# Patient Record
Sex: FEMALE | Race: WHITE
[De-identification: names, ages, dates, MRNs, and addresses within clinical notes are randomized per-mention and may not be internally consistent; named-entity substitution may affect disease eponyms.]

---

## 2024-02-28 ENCOUNTER — HOSPITAL ENCOUNTER (OUTPATIENT)
Dept: HOSPITAL 99 - RAD | Age: 74
End: 2024-02-28
Payer: MEDICARE

## 2024-02-28 DIAGNOSIS — Z12.2: Primary | ICD-10-CM

## 2024-02-28 DIAGNOSIS — R91.1: ICD-10-CM

## 2024-02-28 DIAGNOSIS — F17.290: ICD-10-CM

## 2024-03-15 ENCOUNTER — HOSPITAL ENCOUNTER (OUTPATIENT)
Dept: HOSPITAL 99 - WDC | Age: 74
End: 2024-03-15
Payer: MEDICARE

## 2024-03-15 DIAGNOSIS — Z12.31: Primary | ICD-10-CM

## 2024-04-05 ENCOUNTER — HOSPITAL ENCOUNTER (OUTPATIENT)
Dept: HOSPITAL 99 - GI | Age: 74
End: 2024-04-05
Payer: MEDICARE

## 2024-04-05 DIAGNOSIS — D12.2: ICD-10-CM

## 2024-04-05 DIAGNOSIS — Z12.11: Primary | ICD-10-CM

## 2024-04-05 DIAGNOSIS — Z86.010: ICD-10-CM

## 2024-04-05 DIAGNOSIS — D12.3: ICD-10-CM

## 2024-04-05 DIAGNOSIS — K57.30: ICD-10-CM

## 2024-04-05 DIAGNOSIS — D12.4: ICD-10-CM

## 2024-04-05 DIAGNOSIS — D12.5: ICD-10-CM

## 2024-04-05 PROCEDURE — 88305 TISSUE EXAM BY PATHOLOGIST: CPT

## 2024-04-05 PROCEDURE — 45390 COLONOSCOPY W/RESECTION: CPT

## 2024-05-06 ENCOUNTER — HOSPITAL ENCOUNTER (OUTPATIENT)
Dept: HOSPITAL 99 - RAD | Age: 74
End: 2024-05-06
Payer: MEDICARE

## 2024-05-06 DIAGNOSIS — R91.8: Primary | ICD-10-CM

## 2024-05-06 LAB
APTT PPP: 34.3 SEC (ref 23.4–35)
BUN SERPL-MCNC: 11 MG/DL (ref 7–17)
CALCIUM SERPL-MCNC: 9.2 MG/DL (ref 8.4–10.2)
CHLORIDE SERPL-SCNC: 102 MMOL/L (ref 98–107)
CO2 SERPL-SCNC: 30 MMOL/L (ref 22–30)
EGFR: > 60
ERYTHROCYTE [DISTWIDTH] IN BLOOD BY AUTOMATED COUNT: 13.1 % (ref 11.5–14.5)
ESTIMATED CREATININE CLEARANCE: 64 ML/MIN
GLUCOSE SERPL-MCNC: 82 MG/DL (ref 70–99)
HCT VFR BLD AUTO: 42.8 % (ref 37–47)
HGB BLD-MCNC: 14 G/DL (ref 12–16)
INR PPP: 0.94
MCHC RBC AUTO-ENTMCNC: 32.7 G/DL (ref 33–37)
MCV RBC AUTO: 94.3 FL (ref 81–99)
PLATELET # BLD AUTO: 257 10^3/UL (ref 130–400)
POTASSIUM SERPL-SCNC: 4.3 MMOL/L (ref 3.5–5.1)
PROTHROMBIN TIME: 12.4 SEC (ref 11.4–14.6)
SODIUM SERPL-SCNC: 138 MMOL/L (ref 135–145)

## 2024-05-13 ENCOUNTER — HOSPITAL ENCOUNTER (OUTPATIENT)
Dept: HOSPITAL 99 - GI | Age: 74
Discharge: HOME | End: 2024-05-13
Payer: MEDICARE

## 2024-05-13 VITALS — OXYGEN SATURATION: 2 %

## 2024-05-13 VITALS
SYSTOLIC BLOOD PRESSURE: 136 MMHG | OXYGEN SATURATION: 2 % | RESPIRATION RATE: 13 BRPM | DIASTOLIC BLOOD PRESSURE: 66 MMHG

## 2024-05-13 VITALS — RESPIRATION RATE: 16 BRPM

## 2024-05-13 VITALS — DIASTOLIC BLOOD PRESSURE: 66 MMHG

## 2024-05-13 VITALS — DIASTOLIC BLOOD PRESSURE: 61 MMHG | RESPIRATION RATE: 18 BRPM | SYSTOLIC BLOOD PRESSURE: 133 MMHG

## 2024-05-13 VITALS — DIASTOLIC BLOOD PRESSURE: 58 MMHG | RESPIRATION RATE: 18 BRPM

## 2024-05-13 VITALS — RESPIRATION RATE: 25 BRPM

## 2024-05-13 VITALS — DIASTOLIC BLOOD PRESSURE: 74 MMHG | RESPIRATION RATE: 19 BRPM | SYSTOLIC BLOOD PRESSURE: 124 MMHG

## 2024-05-13 VITALS — DIASTOLIC BLOOD PRESSURE: 51 MMHG | RESPIRATION RATE: 18 BRPM

## 2024-05-13 VITALS — BODY MASS INDEX: 20.7 KG/M2

## 2024-05-13 DIAGNOSIS — R91.1: ICD-10-CM

## 2024-05-13 DIAGNOSIS — J44.9: Primary | ICD-10-CM

## 2024-05-13 DIAGNOSIS — J98.4: ICD-10-CM

## 2024-05-13 PROCEDURE — 31628 BRONCHOSCOPY/LUNG BX EACH: CPT

## 2024-05-13 PROCEDURE — 31654 BRONCH EBUS IVNTJ PERPH LES: CPT

## 2024-05-13 PROCEDURE — 31623 DX BRONCHOSCOPE/BRUSH: CPT

## 2024-05-13 PROCEDURE — 88305 TISSUE EXAM BY PATHOLOGIST: CPT

## 2024-05-13 PROCEDURE — 31629 BRONCHOSCOPY/NEEDLE BX EACH: CPT

## 2024-05-13 PROCEDURE — 88172 CYTP DX EVAL FNA 1ST EA SITE: CPT

## 2024-05-13 PROCEDURE — C1887 CATHETER, GUIDING: HCPCS

## 2024-05-13 PROCEDURE — 31624 DX BRONCHOSCOPE/LAVAGE: CPT

## 2024-05-13 PROCEDURE — 31627 NAVIGATIONAL BRONCHOSCOPY: CPT

## 2024-05-13 PROCEDURE — 88173 CYTOPATH EVAL FNA REPORT: CPT

## 2024-08-07 ENCOUNTER — HOSPITAL ENCOUNTER (OUTPATIENT)
Dept: HOSPITAL 99 - RAD | Age: 74
End: 2024-08-07
Payer: MEDICARE

## 2024-08-07 DIAGNOSIS — R91.8: Primary | ICD-10-CM

## 2024-08-21 ENCOUNTER — HOSPITAL ENCOUNTER (OUTPATIENT)
Dept: HOSPITAL 99 - PAVMRI | Age: 74
End: 2024-08-21
Payer: MEDICARE

## 2024-08-21 DIAGNOSIS — M23.92: ICD-10-CM

## 2024-08-21 DIAGNOSIS — M17.12: Primary | ICD-10-CM

## 2024-09-06 ENCOUNTER — EVALUATION (OUTPATIENT)
Dept: PHYSICAL THERAPY | Facility: CLINIC | Age: 74
End: 2024-09-06
Payer: COMMERCIAL

## 2024-09-06 DIAGNOSIS — M17.12 PRIMARY OSTEOARTHRITIS OF LEFT KNEE: Primary | ICD-10-CM

## 2024-09-06 PROCEDURE — 97110 THERAPEUTIC EXERCISES: CPT | Performed by: PHYSICAL THERAPIST

## 2024-09-06 PROCEDURE — 97162 PT EVAL MOD COMPLEX 30 MIN: CPT | Performed by: PHYSICAL THERAPIST

## 2024-09-06 NOTE — PROGRESS NOTES
PT Evaluation     Today's date: 2024  Patient name: Pierre Quezada  : 1950  MRN: [id number]  Referring provider: Yanet Damian PA-C  Dx:   Encounter Diagnosis     ICD-10-CM    1. Primary osteoarthritis of left knee  M17.12                Assessment  Impairments: abnormal gait, abnormal or restricted ROM, activity intolerance, impaired physical strength, lacks appropriate home exercise program, pain with function, weight-bearing intolerance, poor posture  and poor body mechanics  Functional limitations: disturbed sleep, walk uneven surfaces, stairs, STS    Assessment details: Pierre Quezada is a 73 y.o. female who presents with pain, decreased strength, decreased ROM, and ambulatory dysfunction. Due to these impairments, patient has difficulty performing ADL's, engaging in social activities, ambulation, stair negotiation, lifting/carrying, transfers. Patient's clinical presentation is consistent with their referring diagnosis of Primary osteoarthritis of left knee  (primary encounter diagnosis). Patient has been educated in home exercise program and plan of care. Patient would benefit from skilled physical therapy services to address their aforementioned functional limitations and progress towards prior level of function and independence with home exercise program.        Prognosis: good  Prognosis details: + factors: high motivation levels  - factors: L knee OA, vertigo history    Goals  Short Term Goals to be accomplished by 10/4/24:  STG1: Pt will be I with HEP  STG2: Pt will demo 0-135 degrees in prone knee flexion ROM to improve flexibility.   STG3: Pt will demo 4+/5 MMT strength in knee to improve stair negotiation.   STG4: Pt will amb community distance without gait deviates due to pain     Long Term Goals to be accomplished by 24:   LTG1: Pt will be able to going up/down stairs with reciprocal gait with handrail without pain.  LTG2: Pt will be able to perform STS without pain and  with good mechanics.  LTG3: Pt will be able to walk for 15-20 minutes without pain.       Plan  Patient would benefit from: PT eval and skilled physical therapy  Planned modality interventions: cryotherapy, thermotherapy: hydrocollator packs and unattended electrical stimulation    Planned therapy interventions: manual therapy, neuromuscular re-education, self care, therapeutic activities, therapeutic exercise, home exercise program, patient education, joint mobilization, balance, strengthening, stretching and therapeutic training    Frequency: 2x week  Duration in weeks: 12  Plan of Care beginning date: 2024  Plan of Care expiration date: 2024  Treatment plan discussed with: patient  Plan details: HEP development, stretching, strengthening, A/AA/PROM, joint mobilizations, posture education, STM/MI as needed to reduce muscle tension, muscle reeducation, PLOC discussed and agreed upon with patient.        Subjective Evaluation    History of Present Illness  Mechanism of injury: Pt is a 72 y/o female who presents to PT with prescription for L knee OA.   Notes that pain began in February when she slipped in the snow. She tried to wait it out and rest but the pain persisted. So in /July she went to ortho where she was given an injection, which did improve her symptoms. She had follow up on 24 where she was prescribed PT, Meloxicam, and MRI ordered. Pt reported that MRI showed OA. She is currently awaiting gell injection as well.     Retired, lives in in-law suit   Patient Goals  Patient goals for therapy: decreased pain, decreased edema, improved balance, increased motion, increased strength and independence with ADLs/IADLs  Patient goal: disturbed sleep, walk uneven surfaces, stairs, STS  Pain  Current pain ratin  At best pain ratin  At worst pain ratin  Location: L knee  Quality: tight and sharp  Relieving factors: rest and relaxation  Aggravating factors: standing, walking, stair  climbing and lifting    Social Support  Steps to enter house: yes  13  13    Treatments  Current treatment: physical therapy        Objective     Observations   Left Knee   Positive for effusion.     Additional Observation Details  Gait: normal   L lateral shift in standing    Active Range of Motion   Left Knee   Flexion: 145 degrees with pain  Extension: 0 degrees with pain    Right Knee   Flexion: 145 degrees   Extension: 0 degrees     Passive Range of Motion   Left Knee   Prone flexion: 125 degrees     Right Knee   Prone flexion: 135 degrees     Mobility   Patellar Mobility:   Left Knee   WFL: medial, lateral, superior and inferior.     Right Knee   WFL: medial, lateral, superior and inferior    Strength/Myotome Testing     Left Knee   Flexion: 4-  Extension: 4-    Right Knee   Flexion: 5  Extension: 5    Additional Strength Details  Pain with L knee extension MMT    S/l hip abd:   R: 4-  L: 4-     Tests     Additional Tests Details  SLS  R: 30 sec   L: 30 sec (pressure noted)           Precautions:   High cholestorol - controlled with meds  Normal BP  Scoliosis  Vertigo with supine to sit  L knee OA - MRI confirmed     Manuals 9/6                                            Neuro Re-Ed 9/6                                                                       Ther Ex 9/6        Prone knee flexion stretch strap :30x3         SLR 2x10 ea        S/l hip abd 2x10 ea        Heel raise                                             Ther Activity 9/6        Step up         Lateral step down         RB

## 2024-09-06 NOTE — LETTER
2024    Yanet Damian PA-C  [address] [place] [id number]    Patient: Pierre Quezada   YOB: 1950   Date of Visit: 2024     Encounter Diagnosis     ICD-10-CM    1. Primary osteoarthritis of left knee  M17.12           Dear Dr. Damian:    Thank you for your recent referral of Pierre Quezada. Please review the attached evaluation summary from Pierre's recent visit.     Please verify that you agree with the plan of care by signing the attached order.     If you have any questions or concerns, please do not hesitate to call.     I sincerely appreciate the opportunity to share in the care of one of your patients and hope to have another opportunity to work with you in the near future.       Sincerely,    Flip Palacio, PT      Referring Provider:      I certify that I have read the below Plan of Care and certify the need for these services furnished under this plan of treatment while under my care.                    Yanet Damian PA-C  [address] [address]  Via Fax: [phone]          PT Evaluation     Today's date: 2024  Patient name: Pierre Quezada  : 1950  MRN: [id number]  Referring provider: Yanet Damian PA-C  Dx:   Encounter Diagnosis     ICD-10-CM    1. Primary osteoarthritis of left knee  M17.12                Assessment  Impairments: abnormal gait, abnormal or restricted ROM, activity intolerance, impaired physical strength, lacks appropriate home exercise program, pain with function, weight-bearing intolerance, poor posture  and poor body mechanics  Functional limitations: disturbed sleep, walk uneven surfaces, stairs, STS    Assessment details: Pierre Quezada is a 73 y.o. female who presents with pain, decreased strength, decreased ROM, and ambulatory dysfunction. Due to these impairments, patient has difficulty performing ADL's, engaging in social activities, ambulation, stair negotiation,  lifting/carrying, transfers. Patient's clinical presentation is consistent with their referring diagnosis of Primary osteoarthritis of left knee  (primary encounter diagnosis). Patient has been educated in home exercise program and plan of care. Patient would benefit from skilled physical therapy services to address their aforementioned functional limitations and progress towards prior level of function and independence with home exercise program.        Prognosis: good  Prognosis details: + factors: high motivation levels  - factors: L knee OA, vertigo history    Goals  Short Term Goals to be accomplished by 10/4/24:  STG1: Pt will be I with HEP  STG2: Pt will demo 0-135 degrees in prone knee flexion ROM to improve flexibility.   STG3: Pt will demo 4+/5 MMT strength in knee to improve stair negotiation.   STG4: Pt will amb community distance without gait deviates due to pain     Long Term Goals to be accomplished by 11/29/24:   LTG1: Pt will be able to going up/down stairs with reciprocal gait with handrail without pain.  LTG2: Pt will be able to perform STS without pain and with good mechanics.  LTG3: Pt will be able to walk for 15-20 minutes without pain.       Plan  Patient would benefit from: PT eval and skilled physical therapy  Planned modality interventions: cryotherapy, thermotherapy: hydrocollator packs and unattended electrical stimulation    Planned therapy interventions: manual therapy, neuromuscular re-education, self care, therapeutic activities, therapeutic exercise, home exercise program, patient education, joint mobilization, balance, strengthening, stretching and therapeutic training    Frequency: 2x week  Duration in weeks: 12  Plan of Care beginning date: 9/6/2024  Plan of Care expiration date: 11/29/2024  Treatment plan discussed with: patient  Plan details: HEP development, stretching, strengthening, A/AA/PROM, joint mobilizations, posture education, STM/MI as needed to reduce muscle tension,  muscle reeducation, PLOC discussed and agreed upon with patient.        Subjective Evaluation    History of Present Illness  Mechanism of injury: Pt is a 74 y/o female who presents to PT with prescription for L knee OA.   Notes that pain began in February when she slipped in the snow. She tried to wait it out and rest but the pain persisted. So in /July she went to ortho where she was given an injection, which did improve her symptoms. She had follow up on 24 where she was prescribed PT, Meloxicam, and MRI ordered. Pt reported that MRI showed OA. She is currently awaiting gell injection as well.     Retired, lives in in-law suit   Patient Goals  Patient goals for therapy: decreased pain, decreased edema, improved balance, increased motion, increased strength and independence with ADLs/IADLs  Patient goal: disturbed sleep, walk uneven surfaces, stairs, STS  Pain  Current pain ratin  At best pain ratin  At worst pain ratin  Location: L knee  Quality: tight and sharp  Relieving factors: rest and relaxation  Aggravating factors: standing, walking, stair climbing and lifting    Social Support  Steps to enter house: yes  13  13    Treatments  Current treatment: physical therapy        Objective     Observations   Left Knee   Positive for effusion.     Additional Observation Details  Gait: normal   L lateral shift in standing    Active Range of Motion   Left Knee   Flexion: 145 degrees with pain  Extension: 0 degrees with pain    Right Knee   Flexion: 145 degrees   Extension: 0 degrees     Passive Range of Motion   Left Knee   Prone flexion: 125 degrees     Right Knee   Prone flexion: 135 degrees     Mobility   Patellar Mobility:   Left Knee   WFL: medial, lateral, superior and inferior.     Right Knee   WFL: medial, lateral, superior and inferior    Strength/Myotome Testing     Left Knee   Flexion: 4-  Extension: 4-    Right Knee   Flexion: 5  Extension: 5    Additional Strength Details  Pain with L  knee extension MMT    S/l hip abd:   R: 4-  L: 4-     Tests     Additional Tests Details  SLS  R: 30 sec   L: 30 sec (pressure noted)           Precautions:   High cholestorol - controlled with meds  Normal BP  Scoliosis  Vertigo with supine to sit  L knee OA - MRI confirmed     Manuals 9/6                                            Neuro Re-Ed 9/6                                                                       Ther Ex 9/6        Prone knee flexion stretch strap :30x3         SLR 2x10 ea        S/l hip abd 2x10 ea        Heel raise                                             Ther Activity 9/6        Step up         Lateral step down         RB

## 2024-09-09 ENCOUNTER — OFFICE VISIT (OUTPATIENT)
Dept: PHYSICAL THERAPY | Facility: CLINIC | Age: 74
End: 2024-09-09
Payer: COMMERCIAL

## 2024-09-09 DIAGNOSIS — M17.12 PRIMARY OSTEOARTHRITIS OF LEFT KNEE: Primary | ICD-10-CM

## 2024-09-09 PROCEDURE — 97110 THERAPEUTIC EXERCISES: CPT

## 2024-09-09 PROCEDURE — 97530 THERAPEUTIC ACTIVITIES: CPT

## 2024-09-09 NOTE — PROGRESS NOTES
"Daily Note     Today's date: 2024  Patient name: Pierre Quezada  : 1950  MRN: [id number]  Referring provider: Yanet Damian PA-C  Dx:   Encounter Diagnosis     ICD-10-CM    1. Primary osteoarthritis of left knee  M17.12                      Subjective: Pierre denies increased L knee soreness following IE. Notes daily compliance with HEP, no questions at this time. Meloxicam offering some pain relief.       Objective: See treatment diary below      Assessment: Pierre tolerated PT treatment well. Initiated session with RB warm up for hip/knee ROM. Reviewed HEP, cues required for proper technique with s/l hip abduction. Introduced STS and stair navigation to further challenge functional strength, foam A and UEA required. Denied knee provocation throughout and post session. Pt would benefit from continued PT to further address impairments and maximize functional level.      Plan: Continue per plan of care.      Precautions:   High cholestorol - controlled with meds  Normal BP  Scoliosis  Vertigo with supine to sit  L knee OA - MRI confirmed     Manuals                                            Neuro Re-Ed                                                                       Ther Ex        Prone knee flexion stretch strap :30x3  :30x3 ea        SLR 2x10 ea 2x10 ea       S/l hip abd 2x10 ea 2x10 ea       Heel raise  3x10        Bridge   2x10                                   Ther Activity        Step up  4\" x10 ea        Lateral step down         RB  Lvl 1 5'       STS   1 foam x10                                                 "

## 2024-09-16 ENCOUNTER — OFFICE VISIT (OUTPATIENT)
Dept: PHYSICAL THERAPY | Facility: CLINIC | Age: 74
End: 2024-09-16
Payer: COMMERCIAL

## 2024-09-16 DIAGNOSIS — M17.12 PRIMARY OSTEOARTHRITIS OF LEFT KNEE: Primary | ICD-10-CM

## 2024-09-16 PROCEDURE — 97110 THERAPEUTIC EXERCISES: CPT

## 2024-09-16 PROCEDURE — 97530 THERAPEUTIC ACTIVITIES: CPT

## 2024-09-16 NOTE — PROGRESS NOTES
"Daily Note     Today's date: 2024  Patient name: Pierre Quezada  : 1950  MRN: [id number]  Referring provider: Yanet Damian PA-C  Dx:   Encounter Diagnosis     ICD-10-CM    1. Primary osteoarthritis of left knee  M17.12                        Subjective: Pierre denies L knee soreness following last PT session. Notes her legs are feeling stronger and overall less pain throughout daily activities. Notes she still does not feel confident going down the steps.      Objective: See treatment diary below      Assessment: Pierre tolerated PT treatment well. Advanced repetitions throughout open chain strengthening, quad and glute fatigue evident by third set. Advanced step height with forward step up, denied knee provocation. Introduced forward step down; moderate cueing required for proper mechanics with significant eccentric quad weakness observed. Add leg press NV. Pt would benefit from continued PT to further address impairments and maximize functional level.      Plan: Continue per plan of care.      Precautions:   High cholestorol - controlled with meds  Normal BP  Scoliosis  Vertigo with supine to sit  L knee OA - MRI confirmed     Manuals                                           Neuro Re-Ed                                                                      Ther Ex        Prone knee flexion stretch strap :30x3  :30x3 ea  :30x3 ea       SLR 2x10 ea 2x10 ea 3x10 ea       S/l hip abd 2x10 ea 2x10 ea 3x10 ea       Heel raise  3x10  3x10      Bridge   2x10  3x10 :05                                  Ther Activity       Step up  4\" x10 ea  6\" 2x10       Lateral step down   2\" x10 ea       RB  Lvl 1 5' Lvl 1 5'       STS   1 foam x10  1 foam 3x10                                                "

## 2024-09-19 ENCOUNTER — OFFICE VISIT (OUTPATIENT)
Dept: PHYSICAL THERAPY | Facility: CLINIC | Age: 74
End: 2024-09-19
Payer: COMMERCIAL

## 2024-09-19 DIAGNOSIS — M17.12 PRIMARY OSTEOARTHRITIS OF LEFT KNEE: Primary | ICD-10-CM

## 2024-09-19 PROCEDURE — 97530 THERAPEUTIC ACTIVITIES: CPT | Performed by: PHYSICAL THERAPIST

## 2024-09-19 PROCEDURE — 97110 THERAPEUTIC EXERCISES: CPT | Performed by: PHYSICAL THERAPIST

## 2024-09-19 NOTE — PROGRESS NOTES
"Daily Note     Today's date: 2024  Patient name: Pierre Quezada  : 1950  MRN: [id number]  Referring provider: Yanet Damian PA-C  Dx:   Encounter Diagnosis     ICD-10-CM    1. Primary osteoarthritis of left knee  M17.12              Subjective: Pt reported that she is scheduled to have an injection today after PT. Noted that she has been improving with her strength and ROM since starting PT.     Objective: See treatment diary below    Assessment: Tolerated treatment well. Difficulty with step exercises due to anterior knee pain. Advanced hip strengthening to improve stair and gait mechanics without pain. Patient demonstrated fatigue post treatment, exhibited good technique with therapeutic exercises, and would benefit from continued PT    Plan: Continue per plan of care.      Precautions:   High cholestorol - controlled with meds  Normal BP  Scoliosis  Vertigo with supine to sit  L knee OA - MRI confirmed     Manuals                                          Neuro Re-Ed                                                                     Ther Ex      Prone knee flexion stretch strap :30x3  :30x3 ea  :30x3 ea  :30x3 ea     SLR 2x10 ea 2x10 ea 3x10 ea  30x ea no rest      S/l hip abd 2x10 ea 2x10 ea 3x10 ea  30x ea no rest     Heel raise  3x10  3x10      Bridge   2x10  3x10 :05  TB abd   OTB :10x10     HL clamshell     OTB 3x10 ea     LAQ     3# 3x10               Updated HEP    5'               Ther Activity      Step up  4\" x10 ea  6\" 2x10  Trialed with UE assist and adjusting step heights, however unable to perform without pain Hold     Lateral step down   2\" x10 ea   Hold     RB  Lvl 1 5' Lvl 1 5'  Lvl1 5'      STS   1 foam x10  1 foam 3x10       U/l LP     *                                      "

## 2024-09-20 ENCOUNTER — APPOINTMENT (OUTPATIENT)
Dept: PHYSICAL THERAPY | Facility: CLINIC | Age: 74
End: 2024-09-20
Payer: COMMERCIAL

## 2024-09-23 ENCOUNTER — EVALUATION (OUTPATIENT)
Dept: PHYSICAL THERAPY | Facility: CLINIC | Age: 74
End: 2024-09-23
Payer: COMMERCIAL

## 2024-09-23 DIAGNOSIS — M17.12 PRIMARY OSTEOARTHRITIS OF LEFT KNEE: Primary | ICD-10-CM

## 2024-09-23 PROCEDURE — 97530 THERAPEUTIC ACTIVITIES: CPT | Performed by: PHYSICAL THERAPIST

## 2024-09-23 PROCEDURE — 97110 THERAPEUTIC EXERCISES: CPT | Performed by: PHYSICAL THERAPIST

## 2024-09-23 PROCEDURE — 97112 NEUROMUSCULAR REEDUCATION: CPT | Performed by: PHYSICAL THERAPIST

## 2024-09-23 NOTE — PROGRESS NOTES
PT Re-Evaluation     Today's date: 2024  Patient name: Pierre Quezada  : 1950  MRN: [id number]  Referring provider: Yanet Damian PA-C  Dx:   Encounter Diagnosis     ICD-10-CM    1. Primary osteoarthritis of left knee  M17.12                Assessment  Impairments: abnormal gait, abnormal or restricted ROM, activity intolerance, impaired physical strength, lacks appropriate home exercise program, pain with function, weight-bearing intolerance, poor posture  and poor body mechanics  Functional limitations: disturbed sleep, walk uneven surfaces, stairs, STS    Assessment details:   Since starting PT Pierre reports GROC improvement of 25%. MMT demonstrated some improvement in L knee strength. ROM assessment demonstrated improved prone knee flexion. Pain assessment demonstrated improvement. Functional outcome measures and subjective reports demonstrated improved functional ability. Despite improvements she continues to present with pain, decreased strength, decreased ROM, and ambulatory dysfunction. Due to these impairments, patient has difficulty performing ADL's, engaging in social activities, ambulation, stair negotiation, lifting/carrying, transfers. Patient's clinical presentation is consistent with their referring diagnosis of Primary osteoarthritis of left knee  (primary encounter diagnosis). Patient has been educated in home exercise program and plan of care. Patient would benefit from skilled physical therapy services to address their aforementioned functional limitations and progress towards prior level of function and independence with home exercise program.        Prognosis: good  Prognosis details: + factors: high motivation levels  - factors: L knee OA, vertigo history    Goals  Short Term Goals to be accomplished by 10/4/24:  STG1: Pt will be I with HEP. Achieved.   STG2: Pt will demo 0-135 degrees in prone knee flexion ROM to improve flexibility. (130 achieved)   STG3: Pt will demo 4+/5  MMT strength in knee to improve stair negotiation. (Progressing toward)  STG4: Pt will amb community distance without gait deviates due to pain. Achieved.      Long Term Goals to be accomplished by 11/29/24:   LTG1: Pt will be able to going up/down stairs with reciprocal gait with handrail without pain. (Progressing toward)  LTG2: Pt will be able to perform STS without pain and with good mechanics. (Progressing toward)  LTG3: Pt will be able to walk for 15-20 minutes without pain. (Progressing toward)      Plan  Patient would benefit from: PT eval and skilled physical therapy  Planned modality interventions: cryotherapy, thermotherapy: hydrocollator packs and unattended electrical stimulation    Planned therapy interventions: manual therapy, neuromuscular re-education, self care, therapeutic activities, therapeutic exercise, home exercise program, patient education, joint mobilization, balance, strengthening, stretching and therapeutic training    Frequency: 2x week  Duration in weeks: 12  Plan of Care beginning date: 9/6/2024  Plan of Care expiration date: 11/29/2024  Treatment plan discussed with: patient  Plan details: HEP development, stretching, strengthening, A/AA/PROM, joint mobilizations, posture education, STM/MI as needed to reduce muscle tension, muscle reeducation, PLOC discussed and agreed upon with patient.        Subjective Evaluation    History of Present Illness  Mechanism of injury: Pt is a 72 y/o female who presents to PT with prescription for L knee OA.   Notes that pain began in February when she slipped in the snow. She tried to wait it out and rest but the pain persisted. So in June/July she went to ortho where she was given an injection, which did improve her symptoms. She had follow up on 8/28/24 where she was prescribed PT, Meloxicam, and MRI ordered. Pt reported that MRI showed OA. She is currently awaiting gell injection as well.     Retired, lives in in-law suit   Patient Goals  Patient  goals for therapy: decreased pain, decreased edema, improved balance, increased motion, increased strength and independence with ADLs/IADLs  Patient goal: disturbed sleep, walk uneven surfaces, stairs, STS  Pain  Current pain ratin  At best pain ratin  At worst pain ratin  Location: L knee  Quality: tight and sharp  Relieving factors: rest and relaxation  Aggravating factors: standing, walking, stair climbing and lifting    Social Support  Steps to enter house: yes  13  13    Treatments  Current treatment: physical therapy        Objective     Observations   Left Knee   Positive for effusion.     Additional Observation Details  Gait: normal   L lateral shift in standing    Active Range of Motion   Left Knee   Flexion: 145 degrees with pain  Extension: 0 degrees with pain    Right Knee   Flexion: 145 degrees   Extension: 0 degrees     Passive Range of Motion   Left Knee   Prone flexion: 130 degrees     Right Knee   Prone flexion: 135 degrees     Mobility   Patellar Mobility:   Left Knee   WFL: medial, lateral, superior and inferior.     Right Knee   WFL: medial, lateral, superior and inferior    Strength/Myotome Testing     Left Knee   Flexion: 4  Extension: 4    Right Knee   Flexion: 5  Extension: 5    Additional Strength Details  Pain with L knee extension MMT    S/l hip abd:   R: 4-  L: 4-     Tests     Additional Tests Details  SLS  R: 30 sec   L: 30 sec (pressure noted)       Precautions:   High cholestorol - controlled with meds  Normal BP  Scoliosis  Vertigo with supine to sit  L knee OA - MRI confirmed     Manuals                                         Neuro Re-Ed     Terminal knee extension ball wall     :10x5    TB shallow squat - hips tap 2 foam      2x10 frequent v/c for hip/knee positioning - tendency to R weight shift     Prone hip ext      :10x5 ea                                        Ther Ex     Prone knee flexion stretch  "strap :30x3  :30x3 ea  :30x3 ea  :30x3 ea :30x3    SLR 2x10 ea 2x10 ea 3x10 ea  30x ea no rest      S/l hip abd 2x10 ea 2x10 ea 3x10 ea  30x ea no rest     Heel raise  3x10  3x10      Bridge   2x10  3x10 :05  TB abd   OTB :10x10     HL clamshell     OTB 3x10 ea     LAQ     3# 3x10  3# 5 sec 2x10     SAQ      3# 5 sec 2x10     Updated HEP    5'               Ther Activity 9/6 9/9 9/16 9/19 9/23    Step up  4\" x10 ea  6\" 2x10  Trialed with UE assist and adjusting step heights, however unable to perform without pain Hold     Lateral step down   2\" x10 ea   Hold     RB  Lvl 1 5' Lvl 1 5'  Lvl1 5'  Lvl1 6'     STS   1 foam x10  1 foam 3x10       U/l LP     Seat5 L only  10# 3x10                                    "

## 2024-09-23 NOTE — LETTER
2024    Yanet Damian PA-C  [address] [address]    Patient: Pierre Quezada   YOB: 1950   Date of Visit: 2024     Encounter Diagnosis     ICD-10-CM    1. Primary osteoarthritis of left knee  M17.12           Dear Dr. Damian:    Thank you for your recent referral of Pierre Quezada. Please review the attached evaluation summary from Pierre's recent visit.     Please verify that you agree with the plan of care by signing the attached order.     If you have any questions or concerns, please do not hesitate to call.     I sincerely appreciate the opportunity to share in the care of one of your patients and hope to have another opportunity to work with you in the near future.       Sincerely,    Flip Palacio, PT      Referring Provider:      I certify that I have read the below Plan of Care and certify the need for these services furnished under this plan of treatment while under my care.                    Yanet Damian PA-C  [address] [address]  Via Fax: [phone]          PT Re-Evaluation     Today's date: 2024  Patient name: Pierre Quezada  : 1950  MRN: [id number]  Referring provider: Yanet Damian PA-C  Dx:   Encounter Diagnosis     ICD-10-CM    1. Primary osteoarthritis of left knee  M17.12                Assessment  Impairments: abnormal gait, abnormal or restricted ROM, activity intolerance, impaired physical strength, lacks appropriate home exercise program, pain with function, weight-bearing intolerance, poor posture  and poor body mechanics  Functional limitations: disturbed sleep, walk uneven surfaces, stairs, STS    Assessment details:   Since starting PT Pierre reports GROC improvement of 25%. MMT demonstrated some improvement in L knee strength. ROM assessment demonstrated improved prone knee flexion. Pain assessment demonstrated improvement. Functional outcome measures and subjective  reports demonstrated improved functional ability. Despite improvements she continues to present with pain, decreased strength, decreased ROM, and ambulatory dysfunction. Due to these impairments, patient has difficulty performing ADL's, engaging in social activities, ambulation, stair negotiation, lifting/carrying, transfers. Patient's clinical presentation is consistent with their referring diagnosis of Primary osteoarthritis of left knee  (primary encounter diagnosis). Patient has been educated in home exercise program and plan of care. Patient would benefit from skilled physical therapy services to address their aforementioned functional limitations and progress towards prior level of function and independence with home exercise program.        Prognosis: good  Prognosis details: + factors: high motivation levels  - factors: L knee OA, vertigo history    Goals  Short Term Goals to be accomplished by 10/4/24:  STG1: Pt will be I with HEP. Achieved.   STG2: Pt will demo 0-135 degrees in prone knee flexion ROM to improve flexibility. (130 achieved)   STG3: Pt will demo 4+/5 MMT strength in knee to improve stair negotiation. (Progressing toward)  STG4: Pt will amb community distance without gait deviates due to pain. Achieved.      Long Term Goals to be accomplished by 11/29/24:   LTG1: Pt will be able to going up/down stairs with reciprocal gait with handrail without pain. (Progressing toward)  LTG2: Pt will be able to perform STS without pain and with good mechanics. (Progressing toward)  LTG3: Pt will be able to walk for 15-20 minutes without pain. (Progressing toward)      Plan  Patient would benefit from: PT eval and skilled physical therapy  Planned modality interventions: cryotherapy, thermotherapy: hydrocollator packs and unattended electrical stimulation    Planned therapy interventions: manual therapy, neuromuscular re-education, self care, therapeutic activities, therapeutic exercise, home exercise  program, patient education, joint mobilization, balance, strengthening, stretching and therapeutic training    Frequency: 2x week  Duration in weeks: 12  Plan of Care beginning date: 2024  Plan of Care expiration date: 2024  Treatment plan discussed with: patient  Plan details: HEP development, stretching, strengthening, A/AA/PROM, joint mobilizations, posture education, STM/MI as needed to reduce muscle tension, muscle reeducation, PLOC discussed and agreed upon with patient.        Subjective Evaluation    History of Present Illness  Mechanism of injury: Pt is a 74 y/o female who presents to PT with prescription for L knee OA.   Notes that pain began in February when she slipped in the snow. She tried to wait it out and rest but the pain persisted. So in  she went to ortho where she was given an injection, which did improve her symptoms. She had follow up on 24 where she was prescribed PT, Meloxicam, and MRI ordered. Pt reported that MRI showed OA. She is currently awaiting gell injection as well.     Retired, lives in in-law suit   Patient Goals  Patient goals for therapy: decreased pain, decreased edema, improved balance, increased motion, increased strength and independence with ADLs/IADLs  Patient goal: disturbed sleep, walk uneven surfaces, stairs, STS  Pain  Current pain ratin  At best pain ratin  At worst pain ratin  Location: L knee  Quality: tight and sharp  Relieving factors: rest and relaxation  Aggravating factors: standing, walking, stair climbing and lifting    Social Support  Steps to enter house: yes  13  13    Treatments  Current treatment: physical therapy        Objective     Observations   Left Knee   Positive for effusion.     Additional Observation Details  Gait: normal   L lateral shift in standing    Active Range of Motion   Left Knee   Flexion: 145 degrees with pain  Extension: 0 degrees with pain    Right Knee   Flexion: 145 degrees   Extension: 0  "degrees     Passive Range of Motion   Left Knee   Prone flexion: 130 degrees     Right Knee   Prone flexion: 135 degrees     Mobility   Patellar Mobility:   Left Knee   WFL: medial, lateral, superior and inferior.     Right Knee   WFL: medial, lateral, superior and inferior    Strength/Myotome Testing     Left Knee   Flexion: 4  Extension: 4    Right Knee   Flexion: 5  Extension: 5    Additional Strength Details  Pain with L knee extension MMT    S/l hip abd:   R: 4-  L: 4-     Tests     Additional Tests Details  SLS  R: 30 sec   L: 30 sec (pressure noted)       Precautions:   High cholestorol - controlled with meds  Normal BP  Scoliosis  Vertigo with supine to sit  L knee OA - MRI confirmed     Manuals 9/6 9/9 9/16 9/19 9/23                                        Neuro Re-Ed 9/6 9/9 9/16 9/19 9/23    Terminal knee extension ball wall     :10x5    TB shallow squat - hips tap 2 foam      2x10 frequent v/c for hip/knee positioning - tendency to R weight shift     Prone hip ext      :10x5 ea                                        Ther Ex 9/6 9/9 9/19 9/23    Prone knee flexion stretch strap :30x3  :30x3 ea  :30x3 ea  :30x3 ea :30x3    SLR 2x10 ea 2x10 ea 3x10 ea  30x ea no rest      S/l hip abd 2x10 ea 2x10 ea 3x10 ea  30x ea no rest     Heel raise  3x10  3x10      Bridge   2x10  3x10 :05  TB abd   OTB :10x10     HL clamshell     OTB 3x10 ea     LAQ     3# 3x10  3# 5 sec 2x10     SAQ      3# 5 sec 2x10     Updated HEP    5'               Ther Activity 9/6 9/9 9/16 9/19 9/23    Step up  4\" x10 ea  6\" 2x10  Trialed with UE assist and adjusting step heights, however unable to perform without pain Hold     Lateral step down   2\" x10 ea   Hold     RB  Lvl 1 5' Lvl 1 5'  Lvl1 5'  Lvl1 6'     STS   1 foam x10  1 foam 3x10       U/l LP     Seat5 L only  10# 3x10                                                    "

## 2024-09-26 ENCOUNTER — APPOINTMENT (OUTPATIENT)
Dept: PHYSICAL THERAPY | Facility: CLINIC | Age: 74
End: 2024-09-26
Payer: COMMERCIAL

## 2024-09-26 ENCOUNTER — OFFICE VISIT (OUTPATIENT)
Dept: PHYSICAL THERAPY | Facility: CLINIC | Age: 74
End: 2024-09-26
Payer: COMMERCIAL

## 2024-09-26 DIAGNOSIS — M17.12 PRIMARY OSTEOARTHRITIS OF LEFT KNEE: Primary | ICD-10-CM

## 2024-09-26 PROCEDURE — 97530 THERAPEUTIC ACTIVITIES: CPT

## 2024-09-26 PROCEDURE — 97110 THERAPEUTIC EXERCISES: CPT

## 2024-09-26 PROCEDURE — 97112 NEUROMUSCULAR REEDUCATION: CPT

## 2024-09-26 NOTE — PROGRESS NOTES
"Daily Note     Today's date: 2024  Patient name: Pierre Quezada  : 1950  MRN: [id number]  Referring provider: Yanet Damian PA-C  Dx:   Encounter Diagnosis     ICD-10-CM    1. Primary osteoarthritis of left knee  M17.12                      Subjective: Pierre reports moderate soreness from LV.       Objective: See treatment diary below      Assessment: Tolerated treatment well. Fair squat mechanics, favoring RLE- little carryover from tactile and verbal cuing. Generalized moderate muscle fatigue noted with SAQ/LAQ, and leg press. Continued PT would be beneficial to improve function.          Plan: Continue per plan of care.       Precautions:   High cholestorol - controlled with meds  Normal BP  Scoliosis  Vertigo with supine to sit  L knee OA - MRI confirmed       Manuals                                        Neuro Re-Ed    Terminal knee extension ball wall     :10x5 :10x5   TB shallow squat - hips tap 2 foam      2x10 frequent v/c for hip/knee positioning - tendency to R weight shift  2x10 frequent v/c for hip/knee positioning - tendency to R weight shift    Prone hip ext      :10x5 ea :10x5 ea                                       Ther Ex    Prone knee flexion stretch strap :30x3  :30x3 ea  :30x3 ea  :30x3 ea :30x3 :30x3   SLR 2x10 ea 2x10 ea 3x10 ea  30x ea no rest   30x ea no rest    S/l hip abd 2x10 ea 2x10 ea 3x10 ea  30x ea no rest  30x ea no rest   Heel raise  3x10  3x10      Bridge   2x10  3x10 :05  TB abd   OTB :10x10  TB abd   OTB :10x10   HL clamshell     OTB 3x10 ea     LAQ     3# 3x10  3# 5 sec 2x10  3# 5 sec 2x10    SAQ      3# 5 sec 2x10  3# 5 sec 2x10    Updated HEP    5'               Ther Activity    Step up HOLD  4\" x10 ea  6\" 2x10  Trialed with UE assist and adjusting step heights, however unable to perform without pain Hold     Lateral step down HOLD   2\" x10 ea   Hold   "   RB  Lvl 1 5' Lvl 1 5'  Lvl1 5'  Lvl1 6'  Lvl1 6'    STS   1 foam x10  1 foam 3x10       U/l LP     Seat5 L only  10# 3x10    Seat5 L only  10# 3x10, 15#x5

## 2024-09-30 ENCOUNTER — OFFICE VISIT (OUTPATIENT)
Dept: PHYSICAL THERAPY | Facility: CLINIC | Age: 74
End: 2024-09-30
Payer: COMMERCIAL

## 2024-09-30 DIAGNOSIS — M17.12 PRIMARY OSTEOARTHRITIS OF LEFT KNEE: Primary | ICD-10-CM

## 2024-09-30 PROCEDURE — 97140 MANUAL THERAPY 1/> REGIONS: CPT | Performed by: PHYSICAL THERAPIST

## 2024-09-30 PROCEDURE — 97530 THERAPEUTIC ACTIVITIES: CPT | Performed by: PHYSICAL THERAPIST

## 2024-09-30 PROCEDURE — 97110 THERAPEUTIC EXERCISES: CPT | Performed by: PHYSICAL THERAPIST

## 2024-09-30 NOTE — PROGRESS NOTES
Daily Note     Today's date: 2024  Patient name: Pierre Quezada  : 1950  MRN: [id number]  Referring provider: Yanet Damian PA-C  Dx:   Encounter Diagnosis     ICD-10-CM    1. Primary osteoarthritis of left knee  M17.12              Subjective: Pt reported that she is improving with her strength and ROM as the sessions continue.     Objective: See treatment diary below    Assessment: Tolerated treatment well. Focused exercises on pain free quad strengthening, which she was able to perform well without provocation of pain. Patient demonstrated fatigue post treatment, exhibited good technique with therapeutic exercises, and would benefit from continued PT    Plan: Continue per plan of care.      Precautions:   High cholestorol - controlled with meds  Normal BP  Scoliosis  Vertigo with supine to sit  L knee OA - MRI confirmed       Manuals    Tib fem / pat mobs  JZ        Knee ext PROM stretch JZ                          Neuro Re-Ed    Terminal knee extension ball wall     :10x5 :10x5   TB shallow squat - hips tap 2 foam      2x10 frequent v/c for hip/knee positioning - tendency to R weight shift  2x10 frequent v/c for hip/knee positioning - tendency to R weight shift    Prone hip ext      :10x5 ea :10x5 ea                                       Ther Ex    Prone knee flexion stretch strap   :30x3 ea  :30x3 ea :30x3 :30x3   SLR 2# 3x10 ea  3x10 ea  30x ea no rest   30x ea no rest    S/l hip abd   3x10 ea  30x ea no rest  30x ea no rest   Heel raise   3x10      Bridge    3x10 :05  TB abd   OTB :10x10  TB abd   OTB :10x10   Fig4 bridge 2x10 ea        HL clamshell     OTB 3x10 ea     LAQ  5# 5 sec 2x10 ea (decreased due to anterior knee pain)  2# 30 sec x4 ea no pain   3# 3x10  3# 5 sec 2x10  3# 5 sec 2x10    SAQ  2# 30 sec x3 ea  decreased due to anterior knee pain    3# 5 sec 2x10  3# 5 sec 2x10    Updated HEP    5'                     "             Ther Activity 9/30 9/16 9/19 9/23 9/26   Step up HOLD   6\" 2x10  Trialed with UE assist and adjusting step heights, however unable to perform without pain Hold     Lateral step down HOLD   2\" x10 ea   Hold     RB Lvl1 5'   Lvl 1 5'  Lvl1 5'  Lvl1 6'  Lvl1 6'    STS    1 foam 3x10       U/l LP Seat8 (5 too close)  20# 3x10     Seat5 L only  10# 3x10    Seat5 L only  10# 3x10, 15#x5                                     "

## 2024-10-03 ENCOUNTER — OFFICE VISIT (OUTPATIENT)
Dept: PHYSICAL THERAPY | Facility: CLINIC | Age: 74
End: 2024-10-03
Payer: COMMERCIAL

## 2024-10-03 DIAGNOSIS — M17.12 PRIMARY OSTEOARTHRITIS OF LEFT KNEE: Primary | ICD-10-CM

## 2024-10-03 PROCEDURE — 97140 MANUAL THERAPY 1/> REGIONS: CPT

## 2024-10-03 PROCEDURE — 97110 THERAPEUTIC EXERCISES: CPT

## 2024-10-03 PROCEDURE — 97530 THERAPEUTIC ACTIVITIES: CPT

## 2024-10-03 NOTE — PROGRESS NOTES
PT Re-Evaluation     Today's date: 10/3/2024  Patient name: Pierre Quezada  : 1950  MRN: [id number]  Referring provider: Yanet Damian PA-C  Dx:   Encounter Diagnosis     ICD-10-CM    1. Primary osteoarthritis of left knee  M17.12           Assessment  Impairments: abnormal gait, abnormal or restricted ROM, activity intolerance, impaired physical strength, lacks appropriate home exercise program, pain with function, weight-bearing intolerance, poor posture  and poor body mechanics  Functional limitations: disturbed sleep, walk uneven surfaces, stairs, STS    Assessment details:   A reassessment was performed on Pierre on 24, however another is required today for insurance authorization reasons, however there have not been significant changes since about one week ago. She does continues to improve with strength and ROM as the sessions continue. Since starting PT Pierre reports GROC improvement of 25-30%. MMT demonstrated some improvement since IE in L knee strength. ROM assessment demonstrated improved prone knee flexion since IE. Pain assessment demonstrated improvement since IE. Functional outcome measures and subjective reports demonstrated improved functional ability. Despite improvements she continues to present with pain, decreased strength, decreased ROM, and ambulatory dysfunction. Due to these impairments, patient has difficulty performing ADL's, engaging in social activities, ambulation, stair negotiation, lifting/carrying, transfers. Patient's clinical presentation is consistent with their referring diagnosis of Primary osteoarthritis of left knee  (primary encounter diagnosis). Patient has been educated in home exercise program and plan of care. Patient would benefit from skilled physical therapy services to address their aforementioned functional limitations and progress towards prior level of function and independence with home exercise program.        Prognosis: good  Prognosis details: +  factors: high motivation levels  - factors: L knee OA, vertigo history    Goals  Short Term Goals to be accomplished by 10/4/24:  STG1: Pt will be I with HEP. Achieved.   STG2: Pt will demo 0-135 degrees in prone knee flexion ROM to improve flexibility. (130 achieved)   STG3: Pt will demo 4+/5 MMT strength in knee to improve stair negotiation. (Progressing toward)  STG4: Pt will amb community distance without gait deviates due to pain. Achieved.      Long Term Goals to be accomplished by 11/29/24:   LTG1: Pt will be able to going up/down stairs with reciprocal gait with handrail without pain. (Progressing toward)  LTG2: Pt will be able to perform STS without pain and with good mechanics. (Progressing toward)  LTG3: Pt will be able to walk for 15-20 minutes without pain. (Progressing toward)      Plan  Patient would benefit from: PT eval and skilled physical therapy  Planned modality interventions: cryotherapy, thermotherapy: hydrocollator packs and unattended electrical stimulation    Planned therapy interventions: manual therapy, neuromuscular re-education, self care, therapeutic activities, therapeutic exercise, home exercise program, patient education, joint mobilization, balance, strengthening, stretching and therapeutic training    Frequency: 2x week  Duration in weeks: 12  Plan of Care beginning date: 9/6/2024  Plan of Care expiration date: 11/29/2024  Treatment plan discussed with: patient  Plan details: HEP development, stretching, strengthening, A/AA/PROM, joint mobilizations, posture education, STM/MI as needed to reduce muscle tension, muscle reeducation, PLOC discussed and agreed upon with patient.        Subjective Evaluation    History of Present Illness  Mechanism of injury: Pt is a 72 y/o female who presents to PT with prescription for L knee OA.   Notes that pain began in February when she slipped in the snow. She tried to wait it out and rest but the pain persisted. So in June/July she went to  ortho where she was given an injection, which did improve her symptoms. She had follow up on 24 where she was prescribed PT, Meloxicam, and MRI ordered. Pt reported that MRI showed OA. She is currently awaiting gell injection as well.     Retired, lives in in-law suit     Patient Goals  Patient goals for therapy: decreased pain, decreased edema, improved balance, increased motion, increased strength and independence with ADLs/IADLs  Patient goal: disturbed sleep, walk uneven surfaces, stairs, STS  Pain  Current pain ratin  At best pain ratin  At worst pain ratin  Location: L knee  Quality: tight and sharp  Relieving factors: rest and relaxation  Aggravating factors: standing, walking, stair climbing and lifting    Social Support  Steps to enter house: yes  13  13    Treatments  Current treatment: physical therapy      Objective     Observations   Left Knee   Positive for effusion.     Additional Observation Details  Gait: normal   L lateral shift in standing    Active Range of Motion   Left Knee   Flexion: 145 degrees with pain  Extension: 0 degrees with pain    Right Knee   Flexion: 145 degrees   Extension: 0 degrees     Passive Range of Motion   Left Knee   Prone flexion: 130 degrees     Right Knee   Prone flexion: 135 degrees     Mobility   Patellar Mobility:   Left Knee   WFL: medial, lateral, superior and inferior.     Right Knee   WFL: medial, lateral, superior and inferior    Strength/Myotome Testing     Left Knee   Flexion: 4  Extension: 4    Right Knee   Flexion: 5  Extension: 5    Additional Strength Details  Pain with L knee extension MMT    S/l hip abd:   R: 4-  L: 4-     Tests     Additional Tests Details  SLS  R: 30 sec   L: 30 sec (pressure noted)       Precautions:   High cholestorol - controlled with meds  Normal BP  Scoliosis  Vertigo with supine to sit  L knee OA - MRI confirmed     Manuals 9/30 10/3  9/19 9/23 9/26   Tib fem / pat mobs  JZ        Knee ext PROM stretch JZ                           Neuro Re-Ed 9/30 10/3  9/19 9/23 9/26   Terminal knee extension ball wall     :10x5 :10x5   TB shallow squat - hips tap 2 foam      2x10 frequent v/c for hip/knee positioning - tendency to R weight shift  2x10 frequent v/c for hip/knee positioning - tendency to R weight shift    Prone hip ext      :10x5 ea :10x5 ea                                       Ther Ex 9/30 10/3  9/19 9/23 9/26   Prone knee flexion stretch strap    :30x3 ea :30x3 :30x3   SLR 2# 3x10 ea   30x ea no rest   30x ea no rest    S/l hip abd    30x ea no rest  30x ea no rest   Heel raise         Bridge     TB abd   OTB :10x10  TB abd   OTB :10x10   Fig4 bridge 2x10 ea        HL clamshell     OTB 3x10 ea     LAQ  5# 5 sec 2x10 ea (decreased due to anterior knee pain)  2# 30 sec x4 ea no pain   3# 3x10  3# 5 sec 2x10  3# 5 sec 2x10    SAQ  2# 30 sec x3 ea  decreased due to anterior knee pain    3# 5 sec 2x10  3# 5 sec 2x10    Updated HEP    5'                                 Ther Activity 9/30 10/3  9/19 9/23 9/26   Step up HOLD    Trialed with UE assist and adjusting step heights, however unable to perform without pain Hold     Lateral step down HOLD     Hold     RB Lvl1 5'    Lvl1 5'  Lvl1 6'  Lvl1 6'    STS          U/l LP Seat8 (5 too close)  20# 3x10     Seat5 L only  10# 3x10    Seat5 L only  10# 3x10, 15#x5

## 2024-10-03 NOTE — PROGRESS NOTES
Daily Note     Today's date: 10/3/2024  Patient name: Pierre Quezada  : 1950  MRN: [id number]  Referring provider: Yanet Damian PA-C  Dx:   Encounter Diagnosis     ICD-10-CM    1. Primary osteoarthritis of left knee  M17.12              Subjective: Pierre reports muscle soreness in thighs following last PT session. Notes gradual improvement in L knee pain as PT sessions progress.     Objective: See treatment diary below    Assessment: Pierre tolerated PT treatment well. Pt was able to complete all interventions today without any limitations except fatigue. Intermittent cueing required for proper exercise technique. Quad weakness remains evident, continue to address. Pt would benefit from continued PT to further address impairments and maximize functional level.    Plan: Continue per plan of care.      Precautions:   High cholestorol - controlled with meds  Normal BP  Scoliosis  Vertigo with supine to sit  L knee OA - MRI confirmed       Manuals 9/30 10/3  9/19 9/23 9/26   Tib fem / pat mobs  SARAH GOLDEN       Knee ext PROM stretch SARAH GOLDEN                         Neuro Re-Ed 9/30 10/3  9/19 9/23 9/26   Terminal knee extension ball wall     :10x5 :10x5   TB shallow squat - hips tap 2 foam      2x10 frequent v/c for hip/knee positioning - tendency to R weight shift  2x10 frequent v/c for hip/knee positioning - tendency to R weight shift    Prone hip ext      :10x5 ea :10x5 ea                                       Ther Ex 9/30 10/3  9/19 9/23 9/26   Prone knee flexion stretch strap    :30x3 ea :30x3 :30x3   SLR 2# 3x10 ea 2# 3x10 ea   30x ea no rest   30x ea no rest    S/l hip abd    30x ea no rest  30x ea no rest   Heel raise         Bridge     TB abd   OTB :10x10  TB abd   OTB :10x10   Fig4 bridge 2x10 ea 2x10 ea        HL clamshell     OTB 3x10 ea     LAQ  5# 5 sec 2x10 ea (decreased due to anterior knee pain)  2# 30 sec x4 ea no pain 3# 2x10   :30x2 ea   3# 3x10  3# 5 sec 2x10  3# 5 sec 2x10    SAQ  2# 30 sec x3  ea  decreased due to anterior knee pain 3# 3x10 ea    3# 5 sec 2x10  3# 5 sec 2x10    Updated HEP    5'                                 Ther Activity 9/30 10/3  9/19 9/23 9/26   Step up HOLD    Trialed with UE assist and adjusting step heights, however unable to perform without pain Hold     Lateral step down HOLD     Hold     RB Lvl1 5'  Lvl 1 5   Lvl1 5'  Lvl1 6'  Lvl1 6'    STS          U/l LP Seat8 (5 too close)  20# 3x10  Seat 6 20# 3x10 ea    Seat5 L only  10# 3x10    Seat5 L only  10# 3x10, 15#x5

## 2024-10-07 ENCOUNTER — OFFICE VISIT (OUTPATIENT)
Dept: PHYSICAL THERAPY | Facility: CLINIC | Age: 74
End: 2024-10-07
Payer: COMMERCIAL

## 2024-10-07 DIAGNOSIS — M17.12 PRIMARY OSTEOARTHRITIS OF LEFT KNEE: Primary | ICD-10-CM

## 2024-10-07 PROCEDURE — 97110 THERAPEUTIC EXERCISES: CPT

## 2024-10-07 PROCEDURE — 97530 THERAPEUTIC ACTIVITIES: CPT

## 2024-10-07 PROCEDURE — 97140 MANUAL THERAPY 1/> REGIONS: CPT

## 2024-10-07 NOTE — PROGRESS NOTES
Daily Note     Today's date: 10/7/2024  Patient name: Pierre Quezada  : 1950  MRN: [id number]  Referring provider: Yanet Damian PA-C  Dx:   Encounter Diagnosis     ICD-10-CM    1. Primary osteoarthritis of left knee  M17.12                Subjective: Pierre reports she was doing a lot of yard work over the weekend, minimal knee aggravation during activity but soreness occurring after. Gradually seeing improvements in strength.     Objective: See treatment diary below    Assessment: Pierre tolerated PT treatment well. Pt displays good technique throughout therapeutic exercises. Was able to advance weight with u/l leg press. Denied knee provocation throughout and post session. Continue to address quad strength. Pt would benefit from continued PT to further address impairments and maximize functional level.      Plan: Continue per plan of care.      Precautions:   High cholestorol - controlled with meds  Normal BP  Scoliosis  Vertigo with supine to sit  L knee OA - MRI confirmed       Manuals 9/30 10/3 10/7  9/23 9/26   Tib fem / pat mobs  JCLAUDIO JW JW      Knee ext PROM stretch JCLAUDIO JW JW                        Neuro Re-Ed 9/30 10/3 10/7  9/23 9/26   Terminal knee extension ball wall     :10x5 :10x5   TB shallow squat - hips tap 2 foam      2x10 frequent v/c for hip/knee positioning - tendency to R weight shift  2x10 frequent v/c for hip/knee positioning - tendency to R weight shift    Prone hip ext      :10x5 ea :10x5 ea                                       Ther Ex 9/30 10/3 10/7  9/23 9/26   Prone knee flexion stretch strap     :30x3 :30x3   SLR 2# 3x10 ea 2# 3x10 ea  2# 3x10 ea    30x ea no rest    S/l hip abd      30x ea no rest   Heel raise         Bridge       TB abd   OTB :10x10   Fig4 bridge 2x10 ea 2x10 ea  2x10 ea      HL clamshell          LAQ  5# 5 sec 2x10 ea (decreased due to anterior knee pain)  2# 30 sec x4 ea no pain 3# 2x10   :30x2 ea  3# 2x10   :30x2 ea   3# 5 sec 2x10  3# 5 sec 2x10    SAQ  2#  30 sec x3 ea  decreased due to anterior knee pain 3# 3x10 ea  3# 3x10 ea   3# 5 sec 2x10  3# 5 sec 2x10    Updated HEP                                    Ther Activity 9/30 10/3 10/7  9/23 9/26   Step up HOLD     Hold     Lateral step down HOLD     Hold     RB Lvl1 5'  Lvl 1 5  Lvl 1 5   Lvl1 6'  Lvl1 6'    STS          U/l LP Seat8 (5 too close)  20# 3x10  Seat 6 20# 3x10 ea  Seat 6 20# 3x10 ea   Seat5 L only  10# 3x10    Seat5 L only  10# 3x10, 15#x5

## 2024-10-10 ENCOUNTER — OFFICE VISIT (OUTPATIENT)
Dept: PHYSICAL THERAPY | Facility: CLINIC | Age: 74
End: 2024-10-10
Payer: COMMERCIAL

## 2024-10-10 DIAGNOSIS — M17.12 PRIMARY OSTEOARTHRITIS OF LEFT KNEE: Primary | ICD-10-CM

## 2024-10-10 PROCEDURE — 97530 THERAPEUTIC ACTIVITIES: CPT | Performed by: PHYSICAL THERAPIST

## 2024-10-10 PROCEDURE — 97140 MANUAL THERAPY 1/> REGIONS: CPT | Performed by: PHYSICAL THERAPIST

## 2024-10-10 PROCEDURE — 97110 THERAPEUTIC EXERCISES: CPT | Performed by: PHYSICAL THERAPIST

## 2024-10-10 NOTE — PROGRESS NOTES
Daily Note     Today's date: 10/10/2024  Patient name: Pierre Quezada  : 1950  MRN: [id number]  Referring provider: Yanet Damian PA-C  Dx:   Encounter Diagnosis     ICD-10-CM    1. Primary osteoarthritis of left knee  M17.12              Subjective: Stated that Tuesday and Wednesday she was able to be out raking leaves without any increase in pain. Noted that she did have a little bit of soreness afterward though. 2/10 upon arrival to PT today with taking Meloxicam at 6am which its her standard time of taking the medication. Noted pain of 0/10 upon leaving.     Objective: See treatment diary below    Assessment: Tolerated treatment well. Advanced strengthening well without provocation of pain. Patient demonstrated fatigue post treatment, exhibited good technique with therapeutic exercises, and would benefit from continued PT    Plan: Continue per plan of care.      Precautions:   High cholestorol - controlled with meds  Normal BP  Scoliosis  Vertigo with supine to sit  L knee OA - MRI confirmed       Manuals 9/30 10/3 10/7 10/10     Tib fem / pat mobs  JZ JW JW JZ     Knee ext PROM stretch JZ JW JW JZ                       Neuro Re-Ed 9/30 10/3 10/7 10/10     Terminal knee extension ball wall         TB shallow squat - hips tap 2 foam          Prone hip ext                                              Ther Ex 9/30 10/3 10/7 10/10     Prone knee flexion stretch strap         SLR 2# 3x10 ea 2# 3x10 ea  2# 3x10 ea       S/l hip abd         Heel raise step     3x10     Calf stretch step    :30x2 ea     Fig4 bridge 2x10 ea 2x10 ea  2x10 ea      HL clamshell          LAQ  5# 5 sec 2x10 ea (decreased due to anterior knee pain)  2# 30 sec x4 ea no pain 3# 2x10   :30x2 ea  3# 2x10   :30x2 ea  6# 2x10     SAQ  2# 30 sec x3 ea  decreased due to anterior knee pain 3# 3x10 ea  3# 3x10 ea  3# 20x  6# 2x10              Stand knee flexion    3x10                        Ther Activity 9/30 10/3 10/7 10/10     Step up     " 2UE  2\" 2x10  4\" 5x pain     Lateral step down          RB Lvl1 5'  Lvl 1 5  Lvl 1 5  Lvl1 5'      STS          U/l LP Seat8 (5 too close)  20# 3x10  Seat 6 20# 3x10 ea  Seat 6 20# 3x10 ea  Seat6  20# 2x10  30# 2x10                                         "

## 2024-10-14 ENCOUNTER — OFFICE VISIT (OUTPATIENT)
Dept: PHYSICAL THERAPY | Facility: CLINIC | Age: 74
End: 2024-10-14
Payer: COMMERCIAL

## 2024-10-14 DIAGNOSIS — M17.12 PRIMARY OSTEOARTHRITIS OF LEFT KNEE: Primary | ICD-10-CM

## 2024-10-14 PROCEDURE — 97110 THERAPEUTIC EXERCISES: CPT

## 2024-10-14 PROCEDURE — 97140 MANUAL THERAPY 1/> REGIONS: CPT

## 2024-10-14 PROCEDURE — 97530 THERAPEUTIC ACTIVITIES: CPT

## 2024-10-14 NOTE — PROGRESS NOTES
"Daily Note     Today's date: 10/14/2024  Patient name: Pierre Quezada  : 1950  MRN: [id number]  Referring provider: Yanet Damian PA-C  Dx:   Encounter Diagnosis     ICD-10-CM    1. Primary osteoarthritis of left knee  M17.12              Subjective: Pierre reports she is noticing improvement in pain levels and feeling stronger throughout some daily activities, stair navigation remains most challenging at this time.        Objective: See treatment diary below    Assessment: Pierre tolerated PT treatment well. Displaying good knee ROM with passive stretching. Quad strength is gradually improving, greater challenge observed on LLE throughout open and closed chain strengthening. Was able to perform 4\" step up without knee provocation. Pt would benefit from continued PT to further address impairments and maximize functional level.      Plan: Continue per plan of care.      Precautions:   High cholestorol - controlled with meds  Normal BP  Scoliosis  Vertigo with supine to sit  L knee OA - MRI confirmed       Manuals 9/30 10/3 10/7 10/10 10/14    Tib fem / pat mobs  JCLAUDIO JW JW JCLAUDIO JW    Knee ext PROM stretch JCLAUDIO JW JW JZ JW                      Neuro Re-Ed 9/30 10/3 10/7 10/10 10/14    Terminal knee extension ball wall         TB shallow squat - hips tap 2 foam          Prone hip ext                                              Ther Ex 9/30 10/3 10/7 10/10 10/14    Prone knee flexion stretch strap         SLR 2# 3x10 ea 2# 3x10 ea  2# 3x10 ea       S/l hip abd         Heel raise step     3x10 3x10     Calf stretch step    :30x2 ea :30x2 ea    Fig4 bridge 2x10 ea 2x10 ea  2x10 ea      HL clamshell          LAQ  5# 5 sec 2x10 ea (decreased due to anterior knee pain)  2# 30 sec x4 ea no pain 3# 2x10   :30x2 ea  3# 2x10   :30x2 ea  6# 2x10 5# 3x10 ea     SAQ  2# 30 sec x3 ea  decreased due to anterior knee pain 3# 3x10 ea  3# 3x10 ea  3# 20x  6# 2x10 5# 3x10 ea             Stand knee flexion    3x10  3x10                " "       Ther Activity 9/30 10/3 10/7 10/10 10/14    Step up     2UE  2\" 2x10  4\" 5x pain 4\" 2x10 ea     Lateral step down          RB Lvl1 5'  Lvl 1 5  Lvl 1 5  Lvl1 5'  Lvl 1 5'     STS          U/l LP Seat8 (5 too close)  20# 3x10  Seat 6 20# 3x10 ea  Seat 6 20# 3x10 ea  Seat6  20# 2x10  30# 2x10 Seat 6 30# 3x10                                        "

## 2024-10-17 ENCOUNTER — OFFICE VISIT (OUTPATIENT)
Dept: PHYSICAL THERAPY | Facility: CLINIC | Age: 74
End: 2024-10-17
Payer: COMMERCIAL

## 2024-10-17 DIAGNOSIS — M17.12 PRIMARY OSTEOARTHRITIS OF LEFT KNEE: Primary | ICD-10-CM

## 2024-10-17 PROCEDURE — 97110 THERAPEUTIC EXERCISES: CPT | Performed by: PHYSICAL THERAPIST

## 2024-10-17 PROCEDURE — 97530 THERAPEUTIC ACTIVITIES: CPT | Performed by: PHYSICAL THERAPIST

## 2024-10-17 NOTE — PROGRESS NOTES
"Daily Note     Today's date: 10/17/2024  Patient name: Pierre Quezada  : 1950  MRN: [id number]  Referring provider: Yanet Damian PA-C  Dx:   Encounter Diagnosis     ICD-10-CM    1. Primary osteoarthritis of left knee  M17.12              Subjective: Pt reported that \"I am really feeling pretty good.\" Noted that she would like to begin transition out of PT and to focused HEP.     Objective: See treatment diary below    Assessment: Tolerated treatment well. Patient demonstrated fatigue post treatment, exhibited good technique with therapeutic exercises, and would benefit from continued PT to confirm HEP and maximize outcome.     Plan: Continue per plan of care. D/C in next couple visits.      Precautions:   High cholestorol - controlled with meds  Normal BP  Scoliosis  Vertigo with supine to sit  L knee OA - MRI confirmed   COPD    Manuals 9/30 10/3 10/7 10/10 10/14 10/17   Tib fem / pat mobs  JCLAUDIO JW JW JCLAUDIO JW    Knee ext PROM stretch JCLAUDIO SMITH JW                      Neuro Re-Ed 9/30 10/3 10/7 10/10 10/14 10/17   Terminal knee extension ball wall         TB shallow squat - hips tap 2 foam          Prone hip ext                                              Ther Ex 9/30 10/3 10/7 10/10 10/14 10/17   Prone knee flexion stretch strap         SLR 2# 3x10 ea 2# 3x10 ea  2# 3x10 ea    2# 3x10 ea   S/l hip abd         Heel raise step     3x10 3x10     Calf stretch step    :30x2 ea :30x2 ea :30x2 ea   Fig4 bridge 2x10 ea 2x10 ea  2x10 ea      HL clamshell          LAQ  5# 5 sec 2x10 ea (decreased due to anterior knee pain)  2# 30 sec x4 ea no pain 3# 2x10   :30x2 ea  3# 2x10   :30x2 ea  6# 2x10 5# 3x10 ea  5# 3x10 ea   SAQ  2# 30 sec x3 ea  decreased due to anterior knee pain 3# 3x10 ea  3# 3x10 ea  3# 20x  6# 2x10 5# 3x10 ea 5# 3x10 ea            Stand knee flexion    3x10  3x10  3x10                      Ther Activity 9/30 10/3 10/7 10/10 10/14 10/17   Step up     2UE  2\" 2x10  4\" 5x pain 4\" 2x10 ea  2UE " "assist  4\" 3x10 ea   Lateral step down          RB Lvl1 5'  Lvl 1 5  Lvl 1 5  Lvl1 5'  Lvl 1 5'  Lvl2 5' (significantly more difficult than lvl2)   STS          U/l LP Seat8 (5 too close)  20# 3x10  Seat 6 20# 3x10 ea  Seat 6 20# 3x10 ea  Seat6  20# 2x10  30# 2x10 Seat 6 30# 3x10 Seat6   30# 2x10 ea  40# 2x10 ea                                         "

## 2024-10-21 ENCOUNTER — OFFICE VISIT (OUTPATIENT)
Dept: PHYSICAL THERAPY | Facility: CLINIC | Age: 74
End: 2024-10-21
Payer: COMMERCIAL

## 2024-10-21 DIAGNOSIS — M17.12 PRIMARY OSTEOARTHRITIS OF LEFT KNEE: Primary | ICD-10-CM

## 2024-10-21 PROCEDURE — 97110 THERAPEUTIC EXERCISES: CPT

## 2024-10-21 PROCEDURE — 97530 THERAPEUTIC ACTIVITIES: CPT

## 2024-10-21 NOTE — PROGRESS NOTES
"Daily Note     Today's date: 10/21/2024  Patient name: Pierre Quezada  : 1950  MRN: [id number]  Referring provider: Yanet Damian PA-C  Dx:   Encounter Diagnosis     ICD-10-CM    1. Primary osteoarthritis of left knee  M17.12                Subjective: Pierre notes muscles soreness following last PT session, \"it was a good workout\". Notes she feels ready to discharge to [facility] next visit.     Objective: See treatment diary below    Assessment: Pierre tolerated PT treatment well. Quad strength improving with each visit. Was able to advance step height with forward step up, denied knee provocation. Update [facility] for discharge.       Plan: Continue per plan of care. D/C next visit.      Precautions:   High cholestorol - controlled with meds  Normal BP  Scoliosis  Vertigo with supine to sit  L knee OA - MRI confirmed   COPD    Manuals 10/21  10/7 10/10 10/14 10/17   Tib fem / pat mobs    CHANTEL GOLDEN    Knee ext PROM stretch    SARAH JW                      Neuro Re-Ed 10/21  10/7 10/10 10/14 10/17   Terminal knee extension ball wall         TB shallow squat - hips tap 2 foam          Prone hip ext                                              Ther Ex 10/21  10/7 10/10 10/14 10/17   Prone knee flexion stretch strap         SLR 2# 3x10 ea   2# 3x10 ea    2# 3x10 ea   S/l hip abd         Heel raise step  3x10    3x10 3x10     Calf stretch step :30x3    :30x2 ea :30x2 ea :30x2 ea   Fig4 bridge   2x10 ea      HL clamshell          LAQ  5# 3x10 ea   3# 2x10   :30x2 ea  6# 2x10 5# 3x10 ea  5# 3x10 ea   SAQ  5# 3x10 ea   3# 3x10 ea  3# 20x  6# 2x10 5# 3x10 ea 5# 3x10 ea            Stand knee flexion 3x10 ea    3x10  3x10  3x10                      Ther Activity 10/21  10/7 10/10 10/14 10/17   Step up  4\" 2x10   6\" x10 ea    2UE  2\" 2x10  4\" 5x pain 4\" 2x10 ea  2UE assist  4\" 3x10 ea   Lateral step down          RB Lvl 2 5'   Lvl 1 5  Lvl1 5'  Lvl 1 5'  Lvl2 5' (significantly more difficult than lvl2)   STS          U/l LP " Seat6   30# 2x10 ea  40# 2x10 ea  Seat 6 20# 3x10 ea  Seat6  20# 2x10  30# 2x10 Seat 6 30# 3x10 Seat6   30# 2x10 ea  40# 2x10 ea

## 2024-10-24 ENCOUNTER — OFFICE VISIT (OUTPATIENT)
Dept: PHYSICAL THERAPY | Facility: CLINIC | Age: 74
End: 2024-10-24
Payer: COMMERCIAL

## 2024-10-24 DIAGNOSIS — M17.12 PRIMARY OSTEOARTHRITIS OF LEFT KNEE: Primary | ICD-10-CM

## 2024-10-24 PROCEDURE — 97112 NEUROMUSCULAR REEDUCATION: CPT | Performed by: PHYSICAL THERAPIST

## 2024-10-24 PROCEDURE — 97140 MANUAL THERAPY 1/> REGIONS: CPT | Performed by: PHYSICAL THERAPIST

## 2024-10-24 PROCEDURE — 97110 THERAPEUTIC EXERCISES: CPT | Performed by: PHYSICAL THERAPIST

## 2024-10-24 NOTE — PROGRESS NOTES
"Daily Note/Discharge     Today's date: 10/24/2024  Patient name: Pierre Quezada  : 1950  MRN: [id number]  Referring provider: Yanet Damian PA-C  Dx:   Encounter Diagnosis     ICD-10-CM    1. Primary osteoarthritis of left knee  M17.12              Subjective: Pt reported that she is ready to d/c to HEP. Stated that she has achieved her functional goals. Noted that she would like to review balance exercises for fall prevention.    Objective: See treatment diary below    Assessment: Tolerated treatment well. Educated on balance training and demonstrated good understanding on safe performance of SLS and tandem balance training. Patient demonstrated fatigue post treatment and exhibited good technique with therapeutic exercises. Educated on HEP. No longer requires PT.     Plan:  D/C.      Precautions:   High cholestorol - controlled with meds  Normal BP  Scoliosis  Vertigo with supine to sit  L knee OA - MRI confirmed   COPD    Manuals 10/21 10/24  10/10 10/14 10/17   Tib fem / pat mobs   SARAH GOLDEN    Knee ext PROM stretch  SARAH SMITH JW                      Neuro Re-Ed 10/21 10/24  10/10 10/14 10/17   Terminal knee extension ball wall         TB shallow squat - hips tap 2 foam          Prone hip ext          Tandem stance  :30x2 ea       SLS  :30x2 ea         Educated on balance 5'                 Ther Ex 10/21 10/24  10/10 10/14 10/17   Prone knee flexion stretch strap         SLR 2# 3x10 ea      2# 3x10 ea   S/l hip abd         Heel raise step  3x10    3x10 3x10     Calf stretch step :30x3  :30x2 ea  :30x2 ea :30x2 ea :30x2 ea   Fig4 bridge         HL clamshell          LAQ  5# 3x10 ea    6# 2x10 5# 3x10 ea  5# 3x10 ea   SAQ  5# 3x10 ea    3# 20x  6# 2x10 5# 3x10 ea 5# 3x10 ea   Supine adductor stretch strap  :15x3 ea       Stand knee flexion 3x10 ea  3x10 ea  3x10  3x10  3x10    Updated HEP  10'                Ther Activity 10/21 10/24  10/10 10/14 10/17   Step up  4\" 2x10   6\" x10 ea    2UE  2\" 2x10  4\" 5x " "pain 4\" 2x10 ea  2UE assist  4\" 3x10 ea   Lateral step down          RB Lvl 2 5'    Lvl1 5'  Lvl 1 5'  Lvl2 5' (significantly more difficult than lvl2)   STS          U/l LP Seat6   30# 2x10 ea  40# 2x10 ea   Seat6  20# 2x10  30# 2x10 Seat 6 30# 3x10 Seat6   30# 2x10 ea  40# 2x10 ea                                           "

## 2024-10-28 ENCOUNTER — APPOINTMENT (OUTPATIENT)
Dept: PHYSICAL THERAPY | Facility: CLINIC | Age: 74
End: 2024-10-28
Payer: COMMERCIAL

## 2024-10-31 ENCOUNTER — APPOINTMENT (OUTPATIENT)
Dept: PHYSICAL THERAPY | Facility: CLINIC | Age: 74
End: 2024-10-31
Payer: COMMERCIAL

## 2025-02-10 ENCOUNTER — HOSPITAL ENCOUNTER (OUTPATIENT)
Dept: HOSPITAL 99 - RAD | Age: 75
End: 2025-02-10
Payer: MEDICARE

## 2025-02-10 DIAGNOSIS — R91.8: Primary | ICD-10-CM

## 2025-03-14 ENCOUNTER — HOSPITAL ENCOUNTER (OUTPATIENT)
Dept: HOSPITAL 99 - RAD | Age: 75
End: 2025-03-14
Payer: MEDICARE

## 2025-03-14 DIAGNOSIS — Z12.39: ICD-10-CM

## 2025-03-14 DIAGNOSIS — Z78.0: Primary | ICD-10-CM
